# Patient Record
Sex: FEMALE | Race: WHITE | NOT HISPANIC OR LATINO
[De-identification: names, ages, dates, MRNs, and addresses within clinical notes are randomized per-mention and may not be internally consistent; named-entity substitution may affect disease eponyms.]

---

## 2017-03-16 PROBLEM — Z00.00 ENCOUNTER FOR PREVENTIVE HEALTH EXAMINATION: Status: ACTIVE | Noted: 2017-03-16

## 2017-03-25 ENCOUNTER — APPOINTMENT (OUTPATIENT)
Dept: MRI IMAGING | Facility: CLINIC | Age: 52
End: 2017-03-25

## 2017-03-25 ENCOUNTER — OUTPATIENT (OUTPATIENT)
Dept: OUTPATIENT SERVICES | Facility: HOSPITAL | Age: 52
LOS: 1 days | End: 2017-03-25
Payer: COMMERCIAL

## 2017-03-25 DIAGNOSIS — Z00.8 ENCOUNTER FOR OTHER GENERAL EXAMINATION: ICD-10-CM

## 2017-03-25 PROCEDURE — A9585: CPT

## 2017-03-25 PROCEDURE — 70553 MRI BRAIN STEM W/O & W/DYE: CPT

## 2017-05-01 ENCOUNTER — RESULT REVIEW (OUTPATIENT)
Age: 52
End: 2017-05-01

## 2018-05-04 ENCOUNTER — RESULT REVIEW (OUTPATIENT)
Age: 53
End: 2018-05-04

## 2020-10-13 ENCOUNTER — APPOINTMENT (OUTPATIENT)
Dept: ORTHOPEDIC SURGERY | Facility: CLINIC | Age: 55
End: 2020-10-13
Payer: COMMERCIAL

## 2020-10-13 VITALS
DIASTOLIC BLOOD PRESSURE: 93 MMHG | HEART RATE: 81 BPM | WEIGHT: 122 LBS | HEIGHT: 65 IN | SYSTOLIC BLOOD PRESSURE: 144 MMHG | BODY MASS INDEX: 20.33 KG/M2

## 2020-10-13 DIAGNOSIS — M25.551 PAIN IN RIGHT HIP: ICD-10-CM

## 2020-10-13 PROCEDURE — 99203 OFFICE O/P NEW LOW 30 MIN: CPT

## 2020-10-13 PROCEDURE — 73502 X-RAY EXAM HIP UNI 2-3 VIEWS: CPT | Mod: RT

## 2020-10-13 NOTE — PHYSICAL EXAM
[de-identified] : General Exam\par \par Well developed, well nourished\par No apparent distress\par Oriented to person, place, and time\par Mood: Normal\par Affect: Normal\par Balance and coordination: Normal\par Gait: Normal\par \par Right hip exam\par \par Skin: Clean/dry and intact\par Inspection: No obvious deformity, no swelling, no ecchymosis.\par Tenderness:  no tenderness over greater trochanter/glut medius insertion. + tenderness pubic symphysis and pubic tubercle and ttp hip flexors. No ttp ischial tuberosity or buttock. No ttp over the ASIS/[place]iac crest.\par ROM: 0-120°. Internal rotation 30 external rotation 70\par Painful ROM: None\par core muscle: pain w passive abduction and active adduction, pain w resisted situp\par Additional tests: No pain with circumduction pos impingement test at 90° mildly positive impingement test at 90° [facility]\par Strength: 5/5 hip flexion/ADD/ABD/Q/H/TA/GS/EHL\par Neuro: Sensation in tact to light touch throughout in dp/sp/tib/astrid/saph distributions\par Pulses: 2+ DP/PT pulses\par  [de-identified] : \par The following radiographs were ordered and read by me during this patients visit. I reviewed each radiograph in detail with the patient and discussed the findings as highlighted below. \par \par AP pelvis lateral right hip are obtained today. Her mild degenerative changes the pubic symphysis. Glenohumeral joint is well maintained. There is no fracture or dislocation.\par

## 2020-10-13 NOTE — HISTORY OF PRESENT ILLNESS
[de-identified] : 54yo female presents complaining of right hip and groin pain for 7 months. She has pain deep within the right groin it radiates to lateral aspect and down the inner aspect of her thigh. Overall stable. The pain is intermittent worse with long periods of walking. She can also feel the pain at rest with sitting. She tried a period of rest, stretching with some temporary relief. No injuries. Denies numbness tingling\par \par The patient's past medical history, past surgical history, medications, allergies, and social history were reviewed by me today with the patient and documented accordingly. In addition, the patient's family history, which is noncontributory to this visit, was also reviewed.\par

## 2023-12-11 ENCOUNTER — APPOINTMENT (OUTPATIENT)
Dept: ORTHOPEDIC SURGERY | Facility: CLINIC | Age: 58
End: 2023-12-11
Payer: COMMERCIAL

## 2023-12-11 VITALS — BODY MASS INDEX: 20.83 KG/M2 | HEIGHT: 65 IN | WEIGHT: 125 LBS

## 2023-12-11 DIAGNOSIS — M77.11 LATERAL EPICONDYLITIS, RIGHT ELBOW: ICD-10-CM

## 2023-12-11 PROCEDURE — 99203 OFFICE O/P NEW LOW 30 MIN: CPT

## 2023-12-11 RX ORDER — MELOXICAM 15 MG/1
15 TABLET ORAL DAILY
Qty: 20 | Refills: 1 | Status: ACTIVE | COMMUNITY
Start: 2023-12-11 | End: 1900-01-01

## 2024-08-16 ENCOUNTER — APPOINTMENT (OUTPATIENT)
Dept: ORTHOPEDIC SURGERY | Facility: CLINIC | Age: 59
End: 2024-08-16
Payer: COMMERCIAL

## 2024-08-16 VITALS — WEIGHT: 125 LBS | HEIGHT: 65 IN | BODY MASS INDEX: 20.83 KG/M2

## 2024-08-16 DIAGNOSIS — Z78.9 OTHER SPECIFIED HEALTH STATUS: ICD-10-CM

## 2024-08-16 DIAGNOSIS — M75.31 CALCIFIC TENDINITIS OF RIGHT SHOULDER: ICD-10-CM

## 2024-08-16 PROCEDURE — J3490M: CUSTOM

## 2024-08-16 PROCEDURE — 73010 X-RAY EXAM OF SHOULDER BLADE: CPT | Mod: RT

## 2024-08-16 PROCEDURE — 99204 OFFICE O/P NEW MOD 45 MIN: CPT | Mod: 25

## 2024-08-16 PROCEDURE — 73030 X-RAY EXAM OF SHOULDER: CPT | Mod: RT

## 2024-08-16 PROCEDURE — 20611 DRAIN/INJ JOINT/BURSA W/US: CPT | Mod: RT

## 2024-08-16 RX ORDER — METHYLPREDNISOLONE 4 MG/1
4 TABLET ORAL
Qty: 1 | Refills: 0 | Status: ACTIVE | COMMUNITY
Start: 2024-08-16 | End: 1900-01-01

## 2024-08-16 NOTE — ASSESSMENT
[FreeTextEntry1] : Atraumatic right shoulder pain in this 58yo F. XR reveals calcium deposit. Discussed a comprehensive treatment plan today. Will injection right shoulder with CSI to allevaite the inflammation. MDP sent to pharmacy. Advised gentle HEP. Discussed possibility of formal barbitage/asp down the line. RTC 2 weeks for re-eval.    The patient's current medication management of their orthopedic diagnosis was reviewed today: (1) We discussed a comprehensive treatment plan that included pharmaceutical management involving the use of prescription medications. (2) There is a moderate risk of morbidity with further treatment, especially from use of prescription strength medications and possible side effects of these medications which include upset stomach with oral medications, skin reactions to topical medications and cardiac/renal/diabetes issues with long term use. (3) I recommended that the patient follow-up with their medical physician to discuss any significant specific potential issues with long term medication use such as interactions with current medications or with exacerbation of underlying medical comorbidities. (4) The benefits and risks associated with use of injectable, oral or topical, prescription and over the counter anti-inflammatory medications were discussed with the patient. The patient voiced understanding of the risks including but not limited to bleeding, stroke, kidney dysfunction, heart disease, and were referred to the black box warning label for further information.

## 2024-08-16 NOTE — HISTORY OF PRESENT ILLNESS
[de-identified] : 8/16/24: Patient is here for right shoulder pain that began on 8/12/24, not due to injury. Saw Dr. Rosado in 2020 for Calcific tendinitis, had CSI at the time with relief. Denies n/t. Pain is anterior, and radiates down the arm. Difficulty with lifting/extending arm. No recent treatment. Tried Advil with some relief.

## 2024-08-16 NOTE — IMAGING
[Right] : right shoulder [Calcific density] : Calcific density [de-identified] : Right Shoulder Exam:  Inspection: Mild swelling, no ecchymosis, no celeste deformity Palpation: Tenderness to palpation  globally over shoulder Stability: No instability or tenderness over AC joint Range of motion: ROM guarded by pain; there is pain with strength testing Special testing: Positive Romero test, positive impingement sign; speeds and yergason negative Motor and sensory intact distally

## 2024-08-30 ENCOUNTER — APPOINTMENT (OUTPATIENT)
Dept: ORTHOPEDIC SURGERY | Facility: CLINIC | Age: 59
End: 2024-08-30
Payer: COMMERCIAL

## 2024-08-30 ENCOUNTER — TRANSCRIPTION ENCOUNTER (OUTPATIENT)
Age: 59
End: 2024-08-30

## 2024-08-30 VITALS — WEIGHT: 125 LBS | HEIGHT: 65 IN | BODY MASS INDEX: 20.83 KG/M2

## 2024-08-30 DIAGNOSIS — M75.31 CALCIFIC TENDINITIS OF RIGHT SHOULDER: ICD-10-CM

## 2024-08-30 PROCEDURE — 99213 OFFICE O/P EST LOW 20 MIN: CPT

## 2024-08-30 RX ORDER — MELOXICAM 15 MG/1
15 TABLET ORAL
Qty: 21 | Refills: 2 | Status: ACTIVE | COMMUNITY
Start: 2024-08-30 | End: 1900-01-01

## 2024-08-30 NOTE — ASSESSMENT
[FreeTextEntry1] : Symptoms significantly improved with the Medrol Dosepak and PT.  She will continue to monitor for now for any recurrence.  She will contact me if any new issues arise

## 2024-08-30 NOTE — HISTORY OF PRESENT ILLNESS
[de-identified] : 8/16/24: Patient is here for right shoulder pain that began on 8/12/24, not due to injury. Saw Dr. Rosado in 2020 for Calcific tendinitis, had CSI at the time with relief. Denies n/t. Pain is anterior, and radiates down the arm. Difficulty with lifting/extending arm. No recent treatment. Tried Advil with some relief.   8/30/24: here to follow up on right shoulder. CSI () from 8/16/24/MDP gave relief for 1 week. Still has improvement with pain (notes 80% relief). Notes continued discomfort with overhead motion. Has been trying to rest from overuse.

## 2024-08-30 NOTE — IMAGING
[de-identified] : Right Shoulder Exam:  Inspection: Mild swelling, no ecchymosis, no celeste deformity Palpation: minimal Tenderness to palpation  globally over shoulder Stability: No instability or tenderness over AC joint Range of motion: ROM guarded by pain; there is pain with strength testing Special testing: Positive Romero test, positive impingement sign; speeds and yergason negative Motor and sensory intact distally